# Patient Record
Sex: MALE | Race: WHITE | URBAN - METROPOLITAN AREA
[De-identification: names, ages, dates, MRNs, and addresses within clinical notes are randomized per-mention and may not be internally consistent; named-entity substitution may affect disease eponyms.]

---

## 2021-09-02 ENCOUNTER — OFFICE VISIT (OUTPATIENT)
Dept: URBAN - METROPOLITAN AREA CLINIC 40 | Facility: CLINIC | Age: 56
End: 2021-09-02
Payer: COMMERCIAL

## 2021-09-02 DIAGNOSIS — H00.011 HORDEOLUM, RIGHT UPPER LID: Primary | ICD-10-CM

## 2021-09-02 PROCEDURE — 99203 OFFICE O/P NEW LOW 30 MIN: CPT | Performed by: OPTOMETRIST

## 2021-09-02 RX ORDER — CIPROFLOXACIN 250 MG/1
250 MG TABLET, FILM COATED ORAL
Qty: 14 | Refills: 0 | Status: INACTIVE
Start: 2021-09-02 | End: 2021-09-08

## 2021-09-02 RX ORDER — TOBRAMYCIN AND DEXAMETHASONE 3; 1 MG/ML; MG/ML
SUSPENSION/ DROPS OPHTHALMIC
Qty: 5 | Refills: 0 | Status: INACTIVE
Start: 2021-09-02 | End: 2021-09-08

## 2021-09-02 NOTE — IMPRESSION/PLAN
Impression: Hordeolum, right upper lid: H00.011. Plan: Discussed diagnosis in detail with patient. Will continue to observe condition and or symptoms. New medication(s) Rx given today. Lid scrubs and hygiene were explained. Patient instructed to apply warm compresses. If not better see oculoplastics.